# Patient Record
Sex: FEMALE | URBAN - METROPOLITAN AREA
[De-identification: names, ages, dates, MRNs, and addresses within clinical notes are randomized per-mention and may not be internally consistent; named-entity substitution may affect disease eponyms.]

---

## 2019-04-29 ENCOUNTER — APPOINTMENT (RX ONLY)
Dept: URBAN - METROPOLITAN AREA CLINIC 77 | Facility: CLINIC | Age: 79
Setting detail: DERMATOLOGY
End: 2019-04-29

## 2019-04-29 VITALS — HEIGHT: 62 IN | WEIGHT: 135 LBS

## 2019-04-29 DIAGNOSIS — L85.1 ACQUIRED KERATOSIS [KERATODERMA] PALMARIS ET PLANTARIS: ICD-10-CM

## 2019-04-29 DIAGNOSIS — L72.0 EPIDERMAL CYST: ICD-10-CM

## 2019-04-29 DIAGNOSIS — L20.89 OTHER ATOPIC DERMATITIS: ICD-10-CM

## 2019-04-29 PROBLEM — J30.1 ALLERGIC RHINITIS DUE TO POLLEN: Status: ACTIVE | Noted: 2019-04-29

## 2019-04-29 PROBLEM — L40.0 PSORIASIS VULGARIS: Status: ACTIVE | Noted: 2019-04-29

## 2019-04-29 PROBLEM — L85.3 XEROSIS CUTIS: Status: ACTIVE | Noted: 2019-04-29

## 2019-04-29 PROCEDURE — ? COUNSELING

## 2019-04-29 PROCEDURE — 99202 OFFICE O/P NEW SF 15 MIN: CPT

## 2019-04-29 PROCEDURE — ? PRESCRIPTION

## 2019-04-29 PROCEDURE — ? TREATMENT REGIMEN

## 2019-04-29 RX ORDER — AMMONIUM LACTATE 12 G/100G
CREAM TOPICAL BID
Qty: 1 | Refills: 2 | Status: ERX | COMMUNITY
Start: 2019-04-29

## 2019-04-29 RX ORDER — TRIAMCINOLONE ACETONIDE 1 MG/G
CREAM TOPICAL BID
Qty: 1 | Refills: 1 | Status: ERX | COMMUNITY
Start: 2019-04-29

## 2019-04-29 RX ADMIN — AMMONIUM LACTATE: 12 CREAM TOPICAL at 14:48

## 2019-04-29 RX ADMIN — TRIAMCINOLONE ACETONIDE: 1 CREAM TOPICAL at 14:49

## 2019-04-29 ASSESSMENT — LOCATION ZONE DERM
LOCATION ZONE: TRUNK
LOCATION ZONE: TOE
LOCATION ZONE: HAND

## 2019-04-29 ASSESSMENT — LOCATION SIMPLE DESCRIPTION DERM
LOCATION SIMPLE: LEFT HAND
LOCATION SIMPLE: RIGHT GREAT TOE
LOCATION SIMPLE: LEFT GREAT TOE
LOCATION SIMPLE: RIGHT HAND
LOCATION SIMPLE: ABDOMEN

## 2019-04-29 ASSESSMENT — LOCATION DETAILED DESCRIPTION DERM
LOCATION DETAILED: RIGHT DORSAL GREAT TOE
LOCATION DETAILED: RIGHT RIB CAGE
LOCATION DETAILED: LEFT ULNAR DORSAL HAND
LOCATION DETAILED: RIGHT RADIAL DORSAL HAND
LOCATION DETAILED: LEFT DORSAL GREAT TOE

## 2019-04-29 NOTE — HPI: RASH
What Type Of Note Output Would You Prefer (Optional)?: Standard Output
How Severe Is Your Rash?: mild
Is This A New Presentation, Or A Follow-Up?: Rash
Additional History: Positive improvement with treatment

## 2019-04-29 NOTE — PROCEDURE: TREATMENT REGIMEN
Show Eltamd Line: Yes
Action 3: Continue
Detail Level: Zone
Other Instructions: Treatment Plan Order (FIRST TWO WEEKS OF RASH):\\n1. Wash once daily with gentle scent free soap (CeraVe or Cetaphil)\\n2. Apply topical steroid prescription to areas of rash only twice daily \\n3. Apply CeraVe or Cetaphil CREAM to the entire body at least twice daily\\n\\n*** After two weeks have passed it is important to stop the topical steroid prescription. ***\\n\\nOrder of Treatment Plan (AFTER two weeks have passed): \\n1. Wash once daily with gentle scent free soap (CeraVe or Cetaphil)\\n2. Apply CeraVe or Cetaphil CREAM to the entire body at least twice daily\\n\\nIf your rash is not under control after 3-4 weeks have passed, it is important to return to the clinic for further evaluation. Additional workup may be recommended including biopsies.